# Patient Record
Sex: MALE | Race: WHITE | URBAN - METROPOLITAN AREA
[De-identification: names, ages, dates, MRNs, and addresses within clinical notes are randomized per-mention and may not be internally consistent; named-entity substitution may affect disease eponyms.]

---

## 2017-10-12 ENCOUNTER — HOSPITAL ENCOUNTER (OUTPATIENT)
Facility: CLINIC | Age: 77
Discharge: HOME | End: 2017-10-12
Attending: OPHTHALMOLOGY | Admitting: OPHTHALMOLOGY
Payer: MEDICARE

## 2017-10-12 ENCOUNTER — SURGERY (OUTPATIENT)
Age: 77
End: 2017-10-12

## 2017-10-12 ENCOUNTER — ANESTHESIA EVENT (OUTPATIENT)
Dept: SURGERY | Facility: CLINIC | Age: 77
End: 2017-10-12
Payer: MEDICARE

## 2017-10-12 ENCOUNTER — ANESTHESIA (OUTPATIENT)
Dept: SURGERY | Facility: CLINIC | Age: 77
End: 2017-10-12
Payer: MEDICARE

## 2017-10-12 VITALS
RESPIRATION RATE: 16 BRPM | DIASTOLIC BLOOD PRESSURE: 92 MMHG | SYSTOLIC BLOOD PRESSURE: 120 MMHG | OXYGEN SATURATION: 95 % | TEMPERATURE: 97.3 F

## 2017-10-12 PROCEDURE — 40000170 ZZH STATISTIC PRE-PROCEDURE ASSESSMENT II: Performed by: OPHTHALMOLOGY

## 2017-10-12 PROCEDURE — 71000028 ZZH EYE RECOVERY PHASE 2 EACH 15 MINS: Performed by: OPHTHALMOLOGY

## 2017-10-12 PROCEDURE — 37000008 ZZH ANESTHESIA TECHNICAL FEE, 1ST 30 MIN: Performed by: OPHTHALMOLOGY

## 2017-10-12 PROCEDURE — 25000128 H RX IP 250 OP 636: Performed by: OPHTHALMOLOGY

## 2017-10-12 PROCEDURE — 36000104 ZZH EYE SURGERY LEVEL 4 1ST 30 MIN: Performed by: OPHTHALMOLOGY

## 2017-10-12 PROCEDURE — 27210794 ZZH OR GENERAL SUPPLY STERILE: Performed by: OPHTHALMOLOGY

## 2017-10-12 PROCEDURE — 25000128 H RX IP 250 OP 636: Performed by: ANESTHESIOLOGY

## 2017-10-12 PROCEDURE — 27210995 ZZH RX 272: Performed by: OPHTHALMOLOGY

## 2017-10-12 PROCEDURE — V2632 POST CHMBR INTRAOCULAR LENS: HCPCS | Performed by: OPHTHALMOLOGY

## 2017-10-12 PROCEDURE — 25000125 ZZHC RX 250: Performed by: ANESTHESIOLOGY

## 2017-10-12 PROCEDURE — V2787 ASTIGMATISM-CORRECT FUNCTION: HCPCS | Performed by: OPHTHALMOLOGY

## 2017-10-12 PROCEDURE — 25000128 H RX IP 250 OP 636: Performed by: NURSE ANESTHETIST, CERTIFIED REGISTERED

## 2017-10-12 PROCEDURE — A9270 NON-COVERED ITEM OR SERVICE: HCPCS | Performed by: OPHTHALMOLOGY

## 2017-10-12 PROCEDURE — 25000125 ZZHC RX 250: Performed by: OPHTHALMOLOGY

## 2017-10-12 PROCEDURE — 25000125 ZZHC RX 250: Performed by: NURSE ANESTHETIST, CERTIFIED REGISTERED

## 2017-10-12 PROCEDURE — S0020 INJECTION, BUPIVICAINE HYDRO: HCPCS | Performed by: OPHTHALMOLOGY

## 2017-10-12 PROCEDURE — 37000009 ZZH ANESTHESIA TECHNICAL FEE, EACH ADDTL 15 MIN: Performed by: OPHTHALMOLOGY

## 2017-10-12 PROCEDURE — 36000105 ZZH EYE SURGERY LEVEL 4 EA 15 ADDTL MIN: Performed by: OPHTHALMOLOGY

## 2017-10-12 DEVICE — IMPLANTABLE DEVICE: Type: IMPLANTABLE DEVICE | Site: EYE | Status: FUNCTIONAL

## 2017-10-12 RX ORDER — BALANCED SALT SOLUTION 6.4; .75; .48; .3; 3.9; 1.7 MG/ML; MG/ML; MG/ML; MG/ML; MG/ML; MG/ML
SOLUTION OPHTHALMIC PRN
Status: DISCONTINUED | OUTPATIENT
Start: 2017-10-12 | End: 2017-10-12 | Stop reason: HOSPADM

## 2017-10-12 RX ORDER — ONDANSETRON 2 MG/ML
INJECTION INTRAMUSCULAR; INTRAVENOUS PRN
Status: DISCONTINUED | OUTPATIENT
Start: 2017-10-12 | End: 2017-10-12

## 2017-10-12 RX ORDER — PROPOFOL 10 MG/ML
INJECTION, EMULSION INTRAVENOUS PRN
Status: DISCONTINUED | OUTPATIENT
Start: 2017-10-12 | End: 2017-10-12

## 2017-10-12 RX ORDER — PROPARACAINE HYDROCHLORIDE 5 MG/ML
1 SOLUTION/ DROPS OPHTHALMIC ONCE
Status: DISCONTINUED | OUTPATIENT
Start: 2017-10-12 | End: 2017-10-12 | Stop reason: HOSPADM

## 2017-10-12 RX ORDER — SODIUM CHLORIDE, SODIUM LACTATE, POTASSIUM CHLORIDE, CALCIUM CHLORIDE 600; 310; 30; 20 MG/100ML; MG/100ML; MG/100ML; MG/100ML
INJECTION, SOLUTION INTRAVENOUS CONTINUOUS
Status: DISCONTINUED | OUTPATIENT
Start: 2017-10-12 | End: 2017-10-12 | Stop reason: HOSPADM

## 2017-10-12 RX ORDER — PHENYLEPHRINE HYDROCHLORIDE 25 MG/ML
1 SOLUTION/ DROPS OPHTHALMIC
Status: COMPLETED | OUTPATIENT
Start: 2017-10-12 | End: 2017-10-12

## 2017-10-12 RX ORDER — PROPARACAINE HYDROCHLORIDE 5 MG/ML
1 SOLUTION/ DROPS OPHTHALMIC ONCE
Status: COMPLETED | OUTPATIENT
Start: 2017-10-12 | End: 2017-10-12

## 2017-10-12 RX ORDER — FENTANYL CITRATE 50 UG/ML
INJECTION, SOLUTION INTRAMUSCULAR; INTRAVENOUS PRN
Status: DISCONTINUED | OUTPATIENT
Start: 2017-10-12 | End: 2017-10-12

## 2017-10-12 RX ORDER — DICLOFENAC SODIUM 1 MG/ML
1 SOLUTION/ DROPS OPHTHALMIC
Status: COMPLETED | OUTPATIENT
Start: 2017-10-12 | End: 2017-10-12

## 2017-10-12 RX ORDER — CYCLOPENTOLATE HYDROCHLORIDE 10 MG/ML
1 SOLUTION/ DROPS OPHTHALMIC
Status: COMPLETED | OUTPATIENT
Start: 2017-10-12 | End: 2017-10-12

## 2017-10-12 RX ORDER — MOXIFLOXACIN 5 MG/ML
1 SOLUTION/ DROPS OPHTHALMIC
Status: COMPLETED | OUTPATIENT
Start: 2017-10-12 | End: 2017-10-12

## 2017-10-12 RX ORDER — PHENYLEPHRINE HYDROCHLORIDE 25 MG/ML
1 SOLUTION/ DROPS OPHTHALMIC
Status: DISCONTINUED | OUTPATIENT
Start: 2017-10-12 | End: 2017-10-12 | Stop reason: HOSPADM

## 2017-10-12 RX ORDER — DEXAMETHASONE SODIUM PHOSPHATE 10 MG/ML
INJECTION, SOLUTION INTRAMUSCULAR; INTRAVENOUS PRN
Status: DISCONTINUED | OUTPATIENT
Start: 2017-10-12 | End: 2017-10-12 | Stop reason: HOSPADM

## 2017-10-12 RX ORDER — TROPICAMIDE 10 MG/ML
1 SOLUTION/ DROPS OPHTHALMIC
Status: COMPLETED | OUTPATIENT
Start: 2017-10-12 | End: 2017-10-12

## 2017-10-12 RX ADMIN — PHENYLEPHRINE HYDROCHLORIDE 1 DROP: 2.5 SOLUTION/ DROPS OPHTHALMIC at 06:41

## 2017-10-12 RX ADMIN — DICLOFENAC SODIUM 1 DROP: 1 SOLUTION OPHTHALMIC at 06:31

## 2017-10-12 RX ADMIN — PROPOFOL 30 MG: 10 INJECTION, EMULSION INTRAVENOUS at 07:17

## 2017-10-12 RX ADMIN — EPINEPHRINE 500 ML: 1 INJECTION, SOLUTION, CONCENTRATE INTRAVENOUS at 08:12

## 2017-10-12 RX ADMIN — SODIUM CHLORIDE, POTASSIUM CHLORIDE, SODIUM LACTATE AND CALCIUM CHLORIDE: 600; 310; 30; 20 INJECTION, SOLUTION INTRAVENOUS at 07:11

## 2017-10-12 RX ADMIN — WATER 0.5 ML: 1 INJECTION INTRAMUSCULAR; INTRAVENOUS; SUBCUTANEOUS at 08:10

## 2017-10-12 RX ADMIN — FENTANYL CITRATE 50 MCG: 50 INJECTION, SOLUTION INTRAMUSCULAR; INTRAVENOUS at 07:17

## 2017-10-12 RX ADMIN — SODIUM CHLORIDE, POTASSIUM CHLORIDE, SODIUM LACTATE AND CALCIUM CHLORIDE: 600; 310; 30; 20 INJECTION, SOLUTION INTRAVENOUS at 06:46

## 2017-10-12 RX ADMIN — CYCLOPENTOLATE HYDROCHLORIDE 1 DROP: 10 SOLUTION/ DROPS OPHTHALMIC at 06:36

## 2017-10-12 RX ADMIN — LIDOCAINE HYDROCHLORIDE 1 ML: 10 INJECTION, SOLUTION EPIDURAL; INFILTRATION; INTRACAUDAL; PERINEURAL at 06:46

## 2017-10-12 RX ADMIN — ONDANSETRON 4 MG: 2 INJECTION INTRAMUSCULAR; INTRAVENOUS at 08:09

## 2017-10-12 RX ADMIN — MOXIFLOXACIN 1 DROP: 5 SOLUTION/ DROPS OPHTHALMIC at 06:37

## 2017-10-12 RX ADMIN — CYCLOPENTOLATE HYDROCHLORIDE 1 DROP: 10 SOLUTION/ DROPS OPHTHALMIC at 06:31

## 2017-10-12 RX ADMIN — LIDOCAINE HYDROCHLORIDE 6.5 ML: 20 INJECTION, SOLUTION EPIDURAL; INFILTRATION; INTRACAUDAL; PERINEURAL at 07:20

## 2017-10-12 RX ADMIN — HYPROMELLOSE 2910 (4000 MPA.S) 4 DROP: 25 SOLUTION/ DROPS OPHTHALMIC at 08:10

## 2017-10-12 RX ADMIN — TROPICAMIDE 1 DROP: 10 SOLUTION/ DROPS OPHTHALMIC at 06:31

## 2017-10-12 RX ADMIN — MOXIFLOXACIN 1 DROP: 5 SOLUTION/ DROPS OPHTHALMIC at 06:41

## 2017-10-12 RX ADMIN — DICLOFENAC SODIUM 1 DROP: 1 SOLUTION OPHTHALMIC at 06:41

## 2017-10-12 RX ADMIN — MOXIFLOXACIN 1 DROP: 5 SOLUTION/ DROPS OPHTHALMIC at 06:31

## 2017-10-12 RX ADMIN — BALANCED SALT SOLUTION 15 ML: 6.4; .75; .48; .3; 3.9; 1.7 SOLUTION OPHTHALMIC at 08:09

## 2017-10-12 RX ADMIN — TROPICAMIDE 1 DROP: 10 SOLUTION/ DROPS OPHTHALMIC at 06:41

## 2017-10-12 RX ADMIN — PROPARACAINE HYDROCHLORIDE 1 DROP: 5 SOLUTION/ DROPS OPHTHALMIC at 06:30

## 2017-10-12 RX ADMIN — CYCLOPENTOLATE HYDROCHLORIDE 1 DROP: 10 SOLUTION/ DROPS OPHTHALMIC at 06:41

## 2017-10-12 RX ADMIN — MIDAZOLAM HYDROCHLORIDE 1 MG: 1 INJECTION, SOLUTION INTRAMUSCULAR; INTRAVENOUS at 07:17

## 2017-10-12 RX ADMIN — DEXAMETHASONE SODIUM PHOSPHATE 5 MG: 10 INJECTION, SOLUTION INTRAMUSCULAR; INTRAVENOUS at 08:10

## 2017-10-12 RX ADMIN — BALANCED SALT SOLUTION 0.5 ML: 6.4; .75; .48; .3; 3.9; 1.7 SOLUTION OPHTHALMIC at 08:12

## 2017-10-12 RX ADMIN — DICLOFENAC SODIUM 1 DROP: 1 SOLUTION OPHTHALMIC at 06:37

## 2017-10-12 RX ADMIN — DEXMEDETOMIDINE HYDROCHLORIDE 4 MCG: 100 INJECTION, SOLUTION INTRAVENOUS at 07:17

## 2017-10-12 RX ADMIN — TROPICAMIDE 1 DROP: 10 SOLUTION/ DROPS OPHTHALMIC at 06:36

## 2017-10-12 RX ADMIN — PHENYLEPHRINE HYDROCHLORIDE 1 DROP: 2.5 SOLUTION/ DROPS OPHTHALMIC at 06:37

## 2017-10-12 RX ADMIN — PHENYLEPHRINE HYDROCHLORIDE 1 DROP: 2.5 SOLUTION/ DROPS OPHTHALMIC at 06:31

## 2017-10-12 RX ADMIN — EPINEPHRINE 500 ML: 1 INJECTION, SOLUTION, CONCENTRATE INTRAVENOUS at 08:09

## 2017-10-12 RX ADMIN — NEOMYCIN SULFATE, POLYMYXIN B SULFATE, AND DEXAMETHASONE 1 G: 3.5; 10000; 1 OINTMENT OPHTHALMIC at 08:12

## 2017-10-12 RX ADMIN — Medication 1 APPLICATOR: at 08:11

## 2017-10-12 NOTE — ANESTHESIA CARE TRANSFER NOTE
Patient: Isaac Girard    Procedure(s):  LEFT EYE PHACOEMULSIFICATION CLEAR CORNEA WITH TORIC INTRAOCULAR LENS IMPLANT;  LEFT EYE VITRECTOMY PARSPLANA WITH 25 GAUGE SYSTEM, MEMBRANE PEEL, AIR FLUID EXCHANGE  - Wound Class: I-Clean   - Wound Class: I-Clean    Diagnosis: EPIRETINAL MEMBRANE ; CATARACT   Diagnosis Additional Information: No value filed.    Anesthesia Type:   MAC     Note:  Airway :Nasal Cannula  Patient transferred to:PACU  Comments: [facility] in recliner with armrests up, spontaneous respirations, O2 saturation maintained 97% on room air. All monitors and alarms on and functioning, clinically stable vital signs. Report given to recovery RN and questions answered. Patient alert and following verbal directions.Handoff Report: Identifed the Patient, Identified the Reponsible Provider, Reviewed the pertinent medical history, Discussed the surgical course, Reviewed Intra-OP anesthesia mangement and issues during anesthesia, Set expectations for post-procedure period and Allowed opportunity for questions and acknowledgement of understanding      Vitals: (Last set prior to Anesthesia Care Transfer)    CRNA VITALS  10/12/2017 0744 - 10/12/2017 0818      10/12/2017             Pulse: 51    Ht Rate: (!)  48    SpO2: 96 %    Resp Rate (set): 10                Electronically Signed By: PATTI Vázquez CRNA  October 12, 2017  8:18 AM

## 2017-10-12 NOTE — BRIEF OP NOTE
[facility] Brief Operative Note    Pre-operative diagnosis: EPIRETINAL MEMBRANE ; CATARACT , left eye   Post-operative diagnosis Epiretinal membrane and cataract, left eye   Procedure: Procedure(s):  LEFT EYE PHACOEMULSIFICATION CLEAR CORNEA WITH TORIC INTRAOCULAR LENS IMPLANT;  LEFT EYE VITRECTOMY PARSPLANA WITH 25 GAUGE SYSTEM, MEMBRANE PEEL, AIR FLUID EXCHANGE  - Wound Class: I-Clean   - Wound Class: I-Clean   Surgeon(s): Surgeon(s) and Role:  Panel 1:     * Ari Licea MD - Primary    Panel 2:     * Endy Xavier MD - Primary   Estimated blood loss: * No values recorded between 10/12/2017  7:29 AM and 10/12/2017  8:15 AM *    Specimens: * No specimens in log *   Findings: As above

## 2017-10-12 NOTE — IP AVS SNAPSHOT
MRN:[id number]                      After Visit Summary   10/12/2017    Isaac Girard    MRN: [id number]           Thank you!     Thank you for choosing [facility] for your care. Our goal is always to provide you with excellent care. Hearing back from our patients is one way we can continue to improve our services. Please take a few minutes to complete the written survey that you may receive in the mail after you visit with us. Thank you!        Patient Information     Date Of Birth          1940        About your hospital stay     You were admitted on:  October 12, 2017 You last received care in the:  [facility]    You were discharged on:  October 12, 2017       Who to Call     For medical emergencies, please call 911.  For non-urgent questions about your medical care, please call your primary care provider or clinic, None  For questions related to your surgery, please call your surgery clinic        Attending Provider     Provider Ari Ruiz MD Ophthalmology       Primary Care Provider Fax #    [facility] [phone]      Your next 10 appointments already scheduled     Nov 29, 2017   Procedure with Ari Licea MD   [facility] (--)    [address] [place] [address] [place], [address]  [facility] [address] [phone]              Further instructions from your care team        INSTRUCTIONS FOLLOWING SURGERY  DR. NDIAYE, DR. AGUILLON, DR. SANDOVAL, DR. HARTLEY, DR. FREDERICK    Will I have pain?  Some discomfort is normal and expected following surgery.  The first few days after surgery you may need to use prescription pain pills.  Taking Advil (Ibuprofen) regularly may help prevent pain.    Discomfort should gradually decrease and Tylenol or Advil should be sufficient to relieve pain. A foreign body sensation in the cornea of the eye is very common and caused by sutures placed at surgery. These sutures will go away in one  to two weeks. If the pain worsens, you should call the doctor.     Do I need to wear an eye patch?  You do not need to wear an eye patch at home after the doctor has removed the patch on your first day after surgery.  However, you may be more comfortable wearing a patch outside in the sun, when sleeping or napping, or in a roman, windy environment.     How much drainage should I have?  You may expect a moderate amount of drainage for a week.  Gradually the drainage should decrease.  The lids can be cleaned with a clean washcloth and gentle soap or diluted baby shampoo.  Wipe the eyelids gently from the nose outward. Some blood in the tears is a normal finding.    Will there be swelling?  Some swelling is normal for about a week or two after which it will gradually decrease.  Applying a cool compress, using a clean washcloth, for 5 - 10 minutes several times a day may reduce the swelling and make you more comfortable.  People may have some swelling of both eyes, especially if face down positioning is required. The white part of the eye may appear very red or bloody for a week or two. This may get worse a few days after surgery. Though the bright red appearance can look frightening, it is a normal finding early after surgery and will resolve in a few weeks.    Will I need to use eye drops?  You will be using several different kinds of eye drops or ointment (salve) when you leave the hospital.  The directions will be on each bottle. The medication with the red top will keep your eye dilated and may make your eye more sensitive to light. Wearing sunglasses may help. The other medication is a combination antibiotics/steroid to prevent infection and promote healing.  Occasionally a third drop is used to control the pressure in your eye. A new bottle of artificial tears or lubricant ointment may be used along with your prescription eye drops after surgery. You will be using drops from four to eight weeks. Bring all eye  medications (drops, ointments, or pills) with you to each visit.    Always wash your hands before putting in the eye drops.  You may wish to have someone else help you.  Pull down on the lower lid and squeeze one drop from the bottle being careful not to touch the dropper to your eye or eyelid.  One drop is sufficient, but another may be used if the first did not go into the eye.  It is often easier to put in the drops if you are reclining or lying down.  Wait five (5) minutes after the first drop before using the second drop to allow the medications to absorb into the eye.        How long will it take for my vision to improve?  Your vision should gradually improve, but it may take up to six months to regain your best vision.  Frequently, air or gas bubbles are injected into the eye at the time of surgery.  This will blur your vision significantly at first.  As the bubble becomes smaller it will cause a black line in your vision that moves as you move your head.  As the bubble becomes smaller you may notice that it looks more like a bubble or that it will break up into several smaller bubbles. It will take from a few days to a few weeks for the bubble to dissolve and be replaced by clear fluid.     You may notice floaters or double vision after your surgery.  These symptoms usually will decrease with time.  If the double vision is bothersome patching the eye may help.    If you notice a sudden worsening in your vision call your doctor.    Are there any physical restrictions after surgery?  If an air or gas bubble was placed in the eye during surgery you will be asked to spend most of your time (both awake and during the night) with your head in a specific position, NOT ON BACK.  As the eye heals and the bubble dissolves there will be less of a need for you to stay in that specific position.  You should avoid sleeping on your back until the bubble has totally dissolved and you have been given permission from your  surgeon. You should not fly in an airplane or go in high altitudes in the mountains while there is a bubble in your eye. If you should require any other surgery, under general anesthesia, while you still have an air bubble, have your surgeon or anesthetist contact us prior to your surgery. Some anesthetic agents can make the bubble expand and seriously damage your eye.  You will have a green medical alert band placed on your wrist for this reason; this should not be removed until the doctor gives you permission or removes it for you.    Heavy lifting (greater than 50 pounds), swimming and contact sports should be avoided for about 3 to 4 weeks after surgery.    You may resume your usual sexual activities about one week after surgery.    When may I return to work or my normal activities?  Depending on the type of work, you may return to work within a few days.  If your work involves physical activity or driving, you will need to restrict your activities and remain home longer.    You may watch TV, look at magazines, or work puzzles.  Reading may be uncomfortable for several days, but using the eyes will not cause any damage.    You may go outside as usual.  If conditions are windy or roman wear an eye pad to avoid getting dust or dirt in the eye.    Can I travel?  You cannot fly in an airplane or drive into the mountains as long as the air or gas bubble remains in your eye.    Are there any driving restrictions?  Someone will need to drive you home from the hospital.  Generally driving can be resumed in several days if you have good vision in your other eye.  If you do not feel comfortable driving, do not drive!  Your depth perception will be decreased so you will want to try driving during the day in light traffic until you feel comfortable driving.  You should restrict your driving while you are taking prescription pain pills as they also can affect your judgment.    When can I shower and wash my hair?  You may  shower or bathe when you get home, but avoid getting water in your eye.  You may want someone to help you shampoo your hair at first.      You may shave, brush your teeth, or comb your hair.  Do not use make-up, mascara, or creams/lotions around your eyes for several weeks     When will I see the doctor again?  Generally you will be seen the first day after surgery and again 1-2 weeks later.  If you have not received a return appointment before leaving the hospital, you should call our office during the business hours to arrange an appointment. If you will be seeing your local doctor instead of us, you will need to call that office to set up an appointment.    How do I reach a doctor if I have concerns?  One of our doctors is available by calling [phone] in the [facility], [phone] in [place], or [phone] from outside the area.  After normal office hours the answering service will put you in touch with the doctor on call. The doctors take call from home but are available for a retinal emergency. Please try to call for routine questions and prescription refills during business hours.    You should call your doctor if:      You notice a sudden decrease in your vision.      Have severe pain or pain increases rather than subsiding.      You notice a new black curtain over your eye that is not the gas bubble.    If you have any of these symptoms, you may need to be examined.    [facility]  Cataract Surgery Discharge Instructions  [facility] MD ARSENIO Negrete MD J. Hasan, MD C. Nichols, MD J. O'Neill, MD S. Schaefer, MD J. Stephens, MD        Start using drops tomorrow    Ofloxacin (Tan top) - one drop in surgical eye 3 times per day until gone.     Prednisolone acetate 1.0% (pink or white top) - one drop in surgical eye 3 times per day until gone.    Diclofenac (Grey top) - one drop in surgical eye 3  "times per day until gone.      Place shield over surgical eye at bedtime for 3 nights.      The eye will feel itchy, scratchy, and vision will be blurred, you may take Tylenol for the scratchy feeling if this is bothersome.      No eye rubbing or swimming for I week.      You may resume all prescription medications as directed by your primary doctor.      Call if increasing pain, progressively worsening vision or worsening redness of surgical eye.      On-call doctor can be reached at [phone].    [facility] Discharge  Instructions  Anesthesia ([facility])   Adult Discharge Instructions    For 24 hours after surgery    1. Get plenty of rest.  Make arrangements to have a responsible adult stay with you for at least 6 hours after you leave the hospital.  2. Do not drive or use heavy equipment for 24 hours.    3. Do not drink alcohol for 24 hours.  4. Do not sign legal documents or make important decisions for 24 hours.  5. Avoid strenuous or risky activities. You may feel lightheaded.  If so, sit for a few minutes before standing.  Have someone help you get up.   6. Conscious sedation patients may resume a regular diet..  7. Any questions of medical nature, call your physician.    Pending Results     No orders found from 10/10/2017 to 10/13/2017.            Admission Information     Date & Time Provider Department Dept. Phone    10/12/2017 Ari Licea MD [facility] [phone]      Your Vitals Were     Blood Pressure Temperature Respirations Pulse Oximetry          125/85 97.3  F (36.3  C) (Temporal) 16 95%        MyChart Information     [employer] lets you send messages to your doctor, view your test results, renew your prescriptions, schedule appointments and more. To sign up, go to [email or web address] . Click on \"Log in\" on the left side of the screen, which will take you to the Welcome page. Then click on \"Sign up Now\" on the right side " of the page.     You will be asked to enter the access code listed below, as well as some personal information. Please follow the directions to create your username and password.     Your access code is: [email or web address]  Expires: 1/10/2018  8:25 AM     Your access code will  in 90 days. If you need help or a new code, please call your [facility] clinic or [phone].        Care EveryWhere ID     This is your Care EveryWhere ID. This could be used by other organizations to access your [facility] medical records  [id number]        Equal Access to Services     [facility]: Hadii zaid negron hadyulissao Sogm, waaxda luqadaha, qaybta kaalmageoffrey vang, richard garza . So [facility] [phone].    ATENCIÓN: Si habla español, tiene a haywood disposición servicios gratuitos de asistencia lingüística. Llame al [phone].    We comply with applicable federal civil rights laws and [place] laws. We do not discriminate on the basis of race, color, national origin, age, disability, sex, sexual orientation, or gender identity.               Review of your medicines      CONTINUE these medicines which have NOT CHANGED        Dose / Directions    alendronate 70 MG tablet   Commonly known as:  FOSAMAX        Dose:  70 mg   Take 1 tablet by mouth every 7 days.   Quantity:  12 tablet   Refills:  3       calcium-vitamin D 250-125 MG-UNIT per tablet   Commonly known as:  OSCAL        Dose:  2 tablet   Take 2 tablets by mouth 3 times daily.   Quantity:  1 tablet   Refills:  0       chlorthalidone 25 MG tablet   Commonly known as:  HYGROTON        Dose:  25 mg   Take 1 tablet by mouth daily.   Quantity:  90 tablet   Refills:  1       fish oil-omega-3 fatty acids 1000 MG capsule        Dose:  2 g   Take 2 capsules by mouth daily.   Quantity:  180 capsule   Refills:  12       flax seed oil 1000 MG capsule        Dose:  1 capsule   Take 1 capsule by mouth daily.   Quantity:  100 capsule   Refills:  3       multivitamin  per tablet        Dose:  1 tablet   Take 1 tablet by mouth daily.   Quantity:  100 tablet   Refills:  12       simvastatin 20 MG tablet   Commonly known as:  ZOCOR        Dose:  20 mg   Take 1 tablet by mouth At Bedtime. Take 1/2 tablet po at bedtime   Quantity:  90 tablet   Refills:  3                Protect others around you: Learn how to safely use, store and throw away your medicines at www.disposemymeds.org.             Medication List: This is a list of all your medications and when to take them. Check marks below indicate your daily home schedule. Keep this list as a reference.      Medications           Morning Afternoon Evening Bedtime As Needed    alendronate 70 MG tablet   Commonly known as:  FOSAMAX   Take 1 tablet by mouth every 7 days.                                calcium-vitamin D 250-125 MG-UNIT per tablet   Commonly known as:  OSCAL   Take 2 tablets by mouth 3 times daily.                                chlorthalidone 25 MG tablet   Commonly known as:  HYGROTON   Take 1 tablet by mouth daily.                                fish oil-omega-3 fatty acids 1000 MG capsule   Take 2 capsules by mouth daily.                                flax seed oil 1000 MG capsule   Take 1 capsule by mouth daily.                                multivitamin per tablet   Take 1 tablet by mouth daily.                                simvastatin 20 MG tablet   Commonly known as:  ZOCOR   Take 1 tablet by mouth At Bedtime. Take 1/2 tablet po at bedtime

## 2017-10-12 NOTE — ANESTHESIA PREPROCEDURE EVALUATION
Anesthesia Evaluation     . Pt has had prior anesthetic.     No history of anesthetic complications          ROS/MED HX    ENT/Pulmonary:      (-) sleep apnea   Neurologic:       Cardiovascular:     (+) Dyslipidemia, hypertension----. : . . . :. .       METS/Exercise Tolerance:     Hematologic:         Musculoskeletal:   (+) arthritis, , , -       GI/Hepatic:        (-) GERD and liver disease   Renal/Genitourinary:      (-) renal disease   Endo:      (-) Type II DM and thyroid disease   Psychiatric:         Infectious Disease:         Malignancy:         Other:                     Physical Exam  Normal systems: cardiovascular, pulmonary and dental    Airway   Mallampati: II  TM distance: >3 FB  Neck ROM: full    Dental     Cardiovascular       Pulmonary                     Anesthesia Plan      History & Physical Review  History and physical reviewed and following examination; no interval change.    ASA Status:  2 .    NPO Status:  > 8 hours    Plan for MAC Reason for MAC:  Procedure to face, neck, head or breast  PONV prophylaxis:  Ondansetron (or other 5HT-3)       Postoperative Care      Consents  Anesthetic plan, risks, benefits and alternatives discussed with:  Patient..          Procedure: Procedure(s):  PHACOEMULSIFICATION CLEAR CORNEA WITH TORIC INTRAOCULAR LENS IMPLANT  VITRECTOMY PARSPLANA WITH 25 GAUGE SYSTEM  Preop diagnosis: EPIRETINAL MEMBRANE ; CATARACT     No Known Allergies  Past Medical History:   Diagnosis Date     Hyperlipidemia      Hypertension      Osteoporosis      Past Surgical History:   Procedure Laterality Date     ORTHOPEDIC SURGERY  2005    left ankle      ORTHOPEDIC SURGERY  1993    right hip      ORTHOPEDIC SURGERY  2011    right ankle     Prior to Admission medications    Medication Sig Start Date End Date Taking? Authorizing Provider   simvastatin (ZOCOR) 20 MG tablet Take 1 tablet by mouth At Bedtime. Take 1/2 tablet po at bedtime 5/13/11  Yes Jeff Dewitt MD    chlorthalidone (HYGROTEN) 25 MG tablet Take 1 tablet by mouth daily. 5/13/11  Yes Jeff Dewitt MD   fish oil-omega-3 fatty acids 1000 MG capsule Take 2 capsules by mouth daily. 5/13/11  Yes Jeff Dewitt MD   Flaxseed, Linseed, (FLAX SEED OIL) 1000 MG capsule Take 1 capsule by mouth daily. 5/13/11  Yes Jeff Dewitt MD   calcium-vitamin D (OSCAL) 250-125 MG-UNIT per tablet Take 2 tablets by mouth 3 times daily. 5/13/11  Yes Jeff Dewitt MD   ALENdronate (FOSAMAX) 70 MG tablet Take 1 tablet by mouth every 7 days. 5/13/11   Jeff Dewitt MD   Multiple Vitamin (MULTIVITAMIN) per tablet Take 1 tablet by mouth daily. 5/13/11   Jeff Dewitt MD     Current Facility-Administered Medications Ordered in Epic   Medication Dose Route Frequency Last Rate Last Dose     lactated ringers infusion   Intravenous Continuous         phenylephrine (MYDFRIN /SAJAN-SYNEPHRINE) 2.5 % ophthalmic solution 1 drop  1 drop Ophthalmic q5 Min Prior to Surgery         bupivacaine 0.75% (pf) 4.5mL + lidocaine 2% (pf) 4.5mL + hyaluronidase (HYLENEX) 150 units   Retrobulbar Once         povidone-iodine 5 % ophthalmic solution 1 drop  1 drop Ophthalmic Once         proparacaine (ALCAINE) 0.5 % ophthalmic solution 1 drop  1 drop Ophthalmic Once         lidocaine (AKTEN) ophthalmic gel 0.5 mL  0.5 mL Ophthalmic Once         povidone-iodine 5 % ophthalmic solution 1 drop  1 drop Ophthalmic Once         lidocaine 1 % 1 mL  1 mL Other Q1H PRN   1 mL at 10/12/17 0646     lactated ringers infusion   Intravenous Continuous 25 mL/hr at 10/12/17 0646       No current [facility]-ordered outpatient prescriptions on file.     Wt Readings from Last 1 Encounters:   05/13/11 98.9 kg (218 lb)     Temp Readings from Last 1 Encounters:   10/12/17 36.3  C (97.3  F) (Temporal)     BP Readings from Last 6 Encounters:   10/12/17 123/81   05/13/11 146/80     Pulse Readings from Last 4 Encounters:   05/13/11 75     Resp Readings from Last 1  Encounters:   10/12/17 16     SpO2 Readings from Last 1 Encounters:   10/12/17 94%     Recent Labs   Lab Test  05/16/11   1635  05/13/11   1021   NA   --   140   POTASSIUM   --   3.8   CHLORIDE   --   98   CO2   --   28   ANIONGAP   --   14   GLC   --   91   BUN   --   19   CR  0.87  0.86   PAWEL   --   9.5     Recent Labs   Lab Test  05/13/11   1021   HGB  16.0     No results for input(s): INR in the last 77709 hours.    Invalid input(s): APTT   RECENT LABS:   ECG:   ECHO:   CXR:                  .

## 2017-10-12 NOTE — ANESTHESIA POSTPROCEDURE EVALUATION
Patient: Isaac Girard    Procedure(s):  LEFT EYE PHACOEMULSIFICATION CLEAR CORNEA WITH TORIC INTRAOCULAR LENS IMPLANT;  LEFT EYE VITRECTOMY PARSPLANA WITH 25 GAUGE SYSTEM, MEMBRANE PEEL, AIR FLUID EXCHANGE  - Wound Class: I-Clean   - Wound Class: I-Clean    Diagnosis:EPIRETINAL MEMBRANE ; CATARACT   Diagnosis Additional Information: No value filed.    Anesthesia Type:  MAC    Note:  Anesthesia Post Evaluation    Patient location during evaluation: PACU  Patient participation: Able to fully participate in evaluation  Level of consciousness: awake and alert  Pain management: satisfactory to patient  Airway patency: patent  Cardiovascular status: hemodynamically stable  Respiratory status: acceptable and unassisted  Hydration status: balanced  PONV: none     Anesthetic complications: None          Last vitals:  Vitals:    10/12/17 0636 10/12/17 0814 10/12/17 0830   BP: 123/81 125/85 (!) 120/92   Resp: 16 16 16   Temp: 36.3  C (97.3  F)     SpO2: 94% 95% 95%         Electronically Signed By: Casimiro Bolivar MD  October 12, 2017  12:04 PM

## 2017-10-12 NOTE — OP NOTE
PREOPERATIVE DIAGNOSIS: Cataract, Left  Left eye.   POSTOPERATIVE DIAGNOSIS: Cataract, Left  Left eye.   OPERATION: Phacoemulsification with implantation of posterior chamber intraocular lens, Left  Left eye.   ANESTHESIA: Monitored anesthesia care.   INDICATIONS FOR SURGERY: Isaac Girard has noted a progressive decline in the vision of his Left  Left eye secondary to a cataract. This has affected his ability to perform routine functions including reading. The patient has progressive cataract changes consistent with his vision and symptoms.     PROCEDURE: Informed consent was obtained from the patient preoperatively with the risks and alternatives reviewed, including the possibility of loss of vision.  The patient was seated upright and the left eye was marked for placement of a toric IOL.  In the preoperative area, the patient was administered a retrobulbar block by Dr. Xavier. The patient was taken to the operating room. The face was prepped and draped in the usual sterile fashion. Attention was directed to the Left  Left eye. A stab incision was made at the limbus with a 15-degree blade. Viscoat was used to replace aqueous. A keratome was used to make a limbal self-sealing incision 2.5 mm in diameter. A curvilinear capsulorrhexis was performed with the Utrata forceps. Hydrodissection was carried out. The nucleus was removed with the phacoemulsification handpiece in a four-quadrant cracking technique. The cortex was removed with the irrigation and aspiration handpiece. The posterior capsule remained intact. Provisc was used to inflate the capsular bag. A posterior chamber intraocular lens was taken from its case and inspected. It was free of defects and it was folded into the shooter. The lens was then injected into the eye by directing the leading haptic into the capsular bag. The trailing haptic was then placed in the eye with a haptic . The toric markings were aligned. The lens centered well. The  Provisc was removed from the eye with the I&A handpiece. The eye was inflated with balanced salt solution. The wound was inspected and found to be watertight. A single 10.0 nylon was placed.  Dr. Xavier the commenced with his portion of the procedure.    Implant Name Type Inv. Item Serial No.  Lot No. LRB No. Used   EYE IMP IOL MIA 1-PIECE TECNIS TORIC +18.0 [id number] Lens/Eye Implant EYE IMP IOL MIA 1-PIECE TECNIS TORIC +18.0 [id number] [employer]   Left 1       Ari Licea M.D.

## 2017-10-12 NOTE — DISCHARGE INSTRUCTIONS
INSTRUCTIONS FOLLOWING SURGERY  DR. NDIAYE, DR. AGUILLON, DR. SANDOVAL, DR. HARTLEY, DR. FREDERICK    Will I have pain?  Some discomfort is normal and expected following surgery.  The first few days after surgery you may need to use prescription pain pills.  Taking Advil (Ibuprofen) regularly may help prevent pain.    Discomfort should gradually decrease and Tylenol or Advil should be sufficient to relieve pain. A foreign body sensation in the cornea of the eye is very common and caused by sutures placed at surgery. These sutures will go away in one to two weeks. If the pain worsens, you should call the doctor.     Do I need to wear an eye patch?  You do not need to wear an eye patch at home after the doctor has removed the patch on your first day after surgery.  However, you may be more comfortable wearing a patch outside in the sun, when sleeping or napping, or in a roman, windy environment.     How much drainage should I have?  You may expect a moderate amount of drainage for a week.  Gradually the drainage should decrease.  The lids can be cleaned with a clean washcloth and gentle soap or diluted baby shampoo.  Wipe the eyelids gently from the nose outward. Some blood in the tears is a normal finding.    Will there be swelling?  Some swelling is normal for about a week or two after which it will gradually decrease.  Applying a cool compress, using a clean washcloth, for 5 - 10 minutes several times a day may reduce the swelling and make you more comfortable.  People may have some swelling of both eyes, especially if face down positioning is required. The white part of the eye may appear very red or bloody for a week or two. This may get worse a few days after surgery. Though the bright red appearance can look frightening, it is a normal finding early after surgery and will resolve in a few weeks.    Will I need to use eye drops?  You will be using several different kinds of eye drops or ointment (salve) when you  leave the hospital.  The directions will be on each bottle. The medication with the red top will keep your eye dilated and may make your eye more sensitive to light. Wearing sunglasses may help. The other medication is a combination antibiotics/steroid to prevent infection and promote healing.  Occasionally a third drop is used to control the pressure in your eye. A new bottle of artificial tears or lubricant ointment may be used along with your prescription eye drops after surgery. You will be using drops from four to eight weeks. Bring all eye medications (drops, ointments, or pills) with you to each visit.    Always wash your hands before putting in the eye drops.  You may wish to have someone else help you.  Pull down on the lower lid and squeeze one drop from the bottle being careful not to touch the dropper to your eye or eyelid.  One drop is sufficient, but another may be used if the first did not go into the eye.  It is often easier to put in the drops if you are reclining or lying down.  Wait five (5) minutes after the first drop before using the second drop to allow the medications to absorb into the eye.        How long will it take for my vision to improve?  Your vision should gradually improve, but it may take up to six months to regain your best vision.  Frequently, air or gas bubbles are injected into the eye at the time of surgery.  This will blur your vision significantly at first.  As the bubble becomes smaller it will cause a black line in your vision that moves as you move your head.  As the bubble becomes smaller you may notice that it looks more like a bubble or that it will break up into several smaller bubbles. It will take from a few days to a few weeks for the bubble to dissolve and be replaced by clear fluid.     You may notice floaters or double vision after your surgery.  These symptoms usually will decrease with time.  If the double vision is bothersome patching the eye may help.    If  you notice a sudden worsening in your vision call your doctor.    Are there any physical restrictions after surgery?  If an air or gas bubble was placed in the eye during surgery you will be asked to spend most of your time (both awake and during the night) with your head in a specific position, NOT ON BACK.  As the eye heals and the bubble dissolves there will be less of a need for you to stay in that specific position.  You should avoid sleeping on your back until the bubble has totally dissolved and you have been given permission from your surgeon. You should not fly in an airplane or go in high altitudes in the mountains while there is a bubble in your eye. If you should require any other surgery, under general anesthesia, while you still have an air bubble, have your surgeon or anesthetist contact us prior to your surgery. Some anesthetic agents can make the bubble expand and seriously damage your eye.  You will have a green medical alert band placed on your wrist for this reason; this should not be removed until the doctor gives you permission or removes it for you.    Heavy lifting (greater than 50 pounds), swimming and contact sports should be avoided for about 3 to 4 weeks after surgery.    You may resume your usual sexual activities about one week after surgery.    When may I return to work or my normal activities?  Depending on the type of work, you may return to work within a few days.  If your work involves physical activity or driving, you will need to restrict your activities and remain home longer.    You may watch TV, look at magazines, or work puzzles.  Reading may be uncomfortable for several days, but using the eyes will not cause any damage.    You may go outside as usual.  If conditions are windy or roman wear an eye pad to avoid getting dust or dirt in the eye.    Can I travel?  You cannot fly in an airplane or drive into the mountains as long as the air or gas bubble remains in your  eye.    Are there any driving restrictions?  Someone will need to drive you home from the hospital.  Generally driving can be resumed in several days if you have good vision in your other eye.  If you do not feel comfortable driving, do not drive!  Your depth perception will be decreased so you will want to try driving during the day in light traffic until you feel comfortable driving.  You should restrict your driving while you are taking prescription pain pills as they also can affect your judgment.    When can I shower and wash my hair?  You may shower or bathe when you get home, but avoid getting water in your eye.  You may want someone to help you shampoo your hair at first.      You may shave, brush your teeth, or comb your hair.  Do not use make-up, mascara, or creams/lotions around your eyes for several weeks     When will I see the doctor again?  Generally you will be seen the first day after surgery and again 1-2 weeks later.  If you have not received a return appointment before leaving the hospital, you should call our office during the business hours to arrange an appointment. If you will be seeing your local doctor instead of us, you will need to call that office to set up an appointment.    How do I reach a doctor if I have concerns?  One of our doctors is available by calling [phone] in the [place] area, [phone] in [place], or [phone] from outside the area.  After normal office hours the answering service will put you in touch with the doctor on call. The doctors take call from home but are available for a retinal emergency. Please try to call for routine questions and prescription refills during business hours.    You should call your doctor if:      You notice a sudden decrease in your vision.      Have severe pain or pain increases rather than subsiding.      You notice a new black curtain over your eye that is not the gas bubble.    If you have any of these symptoms, you  may need to be examined.    [facility]  Cataract Surgery Discharge Instructions  [facility] MD ARSENIO Negrete MD J. Hasan, MD C. Nichols, MD J. O'Neill, MD S. Schaefer, MD J. Stephens, MD        Start using drops tomorrow    Ofloxacin (Tan top) - one drop in surgical eye 3 times per day until gone.     Prednisolone acetate 1.0% (pink or white top) - one drop in surgical eye 3 times per day until gone.    Diclofenac (Grey top) - one drop in surgical eye 3 times per day until gone.      Place shield over surgical eye at bedtime for 3 nights.      The eye will feel itchy, scratchy, and vision will be blurred, you may take Tylenol for the scratchy feeling if this is bothersome.      No eye rubbing or swimming for I week.      You may resume all prescription medications as directed by your primary doctor.      Call if increasing pain, progressively worsening vision or worsening redness of surgical eye.      On-call doctor can be reached at [phone].    [facility] Discharge  Instructions  Anesthesia ([facility])   Adult Discharge Instructions    For 24 hours after surgery    1. Get plenty of rest.  Make arrangements to have a responsible adult stay with you for at least 6 hours after you leave the hospital.  2. Do not drive or use heavy equipment for 24 hours.    3. Do not drink alcohol for 24 hours.  4. Do not sign legal documents or make important decisions for 24 hours.  5. Avoid strenuous or risky activities. You may feel lightheaded.  If so, sit for a few minutes before standing.  Have someone help you get up.   6. Conscious sedation patients may resume a regular diet..  7. Any questions of medical nature, call your physician.

## 2017-10-12 NOTE — IP AVS SNAPSHOT
[facility]    [address]    [place] [address]    Phone:  [phone]    Fax:  [phone]                                       After Visit Summary   10/12/2017    Isaac Girard    MRN: [id number]           After Visit Summary Signature Page     I have received my discharge instructions, and my questions have been answered. I have discussed any challenges I see with this plan with the nurse or doctor.    ..........................................................................................................................................  Patient/Patient Representative Signature      ..........................................................................................................................................  Patient Representative Print Name and Relationship to Patient    ..................................................               ................................................  Date                                            Time    ..........................................................................................................................................  Reviewed by Signature/Title    ...................................................              ..............................................  Date                                                            Time

## 2017-10-13 NOTE — OP NOTE
DATE OF SURGERY:  10/12/2017      PREOPERATIVE DIAGNOSES:    1.  Epiretinal membrane, left eye.   2.  Cataract, left eye.      POSTOPERATIVE  DIAGNOSES:     1.  Epiretinal membrane, left eye.   2.  Cataract, left eye.      PROCEDURES:   1.  Cataract extraction with intraocular lens implantation, left eye.   2.  25-gauge pars plana vitrectomy, membrane peel, air-fluid exchange, left eye.      SURGEONS:   1.  Ari Licea Jr, MD   2.  Endy Xavier MD      ASSISTANT:  JOEL Bautista      ANESTHESIA:  Local with monitored anesthesia care.      ESTIMATED BLOOD LOSS:  Minimum.      SPECIMENS:  None.      COMPLICATIONS:  None.      INDICATIONS:  Isaac Girard presented with decreased vision in the left eye and was found to have a cataract and epiretinal membrane.  Surgical intervention to address these issues was offered, and the patient decided to proceed after the risks, benefits and alternatives were explained.  Signed and witnessed informed consent was obtained.      DESCRIPTION OF PROCEDURE:  The patient was given a retrobulbar block in the preoperative holding area.  The patient was taken to the operating room and placed in the supine position.  The left eye was prepped and draped in the usual sterile fashion for ophthalmic surgery, and a lid speculum was placed.  Cataract surgery was performed by Dr. Licea.  Please see his separate note for details.  The eye was then opened for a standard 25-gauge pars plana vitrectomy.  The eye was entered with a light pipe and vitrectomy probe, and the BIOM was positioned.  A thorough vitrectomy was performed.  A posterior vitreous separation was created at the optic disc, and the vitreous was trimmed into the periphery.  Attention was turned to the posterior pole.  Magnifying contact lens was placed on the cornea.  Dilute triamcinolone was injected over the macular surface.  The epiretinal membrane and underlying internal limiting membrane were peeled from arcade to arcade  with a 25-gauge forceps.  The BIOM was then repositioned and the retinal periphery was inspected with scleral depression.  No retinal tears were found.  A partial air-fluid exchange was performed.  The corneal suture that was spanning the cataract incision was removed.  All trocars were removed.  The eye was left at physiologic pressure.  The sclerotomies were airtight.  Subconjunctival injections of Ancef and dexamethasone were placed.  Maxitrol and atropine were applied.  A sterile eye pad and shield were taped into position.  The patient was taken to the recovery area in stable condition after tolerating surgery well.  He will follow up in 1 day.         YANIRA AGUILLON MD             D: 10/12/2017 08:15   T: 10/12/2017 22:25   MT:       Name:     FLAKITO CLAY   MRN:      [id number]        Account:        [id number]   :      1940           Procedure Date: 10/12/2017      Document: [id number]

## 2017-11-28 RX ORDER — NITROGLYCERIN 0.4 MG/1
0.4 TABLET SUBLINGUAL EVERY 5 MIN PRN
COMMUNITY

## 2017-11-29 ENCOUNTER — HOSPITAL ENCOUNTER (OUTPATIENT)
Facility: CLINIC | Age: 77
Discharge: HOME | End: 2017-11-29
Attending: OPHTHALMOLOGY | Admitting: OPHTHALMOLOGY
Payer: MEDICARE

## 2017-11-29 ENCOUNTER — SURGERY (OUTPATIENT)
Age: 77
End: 2017-11-29

## 2017-11-29 ENCOUNTER — ANESTHESIA EVENT (OUTPATIENT)
Dept: SURGERY | Facility: CLINIC | Age: 77
End: 2017-11-29
Payer: MEDICARE

## 2017-11-29 ENCOUNTER — ANESTHESIA (OUTPATIENT)
Dept: SURGERY | Facility: CLINIC | Age: 77
End: 2017-11-29
Payer: MEDICARE

## 2017-11-29 VITALS
RESPIRATION RATE: 16 BRPM | SYSTOLIC BLOOD PRESSURE: 119 MMHG | HEIGHT: 71 IN | TEMPERATURE: 95.9 F | WEIGHT: 214.6 LBS | OXYGEN SATURATION: 97 % | DIASTOLIC BLOOD PRESSURE: 76 MMHG | BODY MASS INDEX: 30.04 KG/M2

## 2017-11-29 PROCEDURE — 25000125 ZZHC RX 250: Performed by: ANESTHESIOLOGY

## 2017-11-29 PROCEDURE — 40000170 ZZH STATISTIC PRE-PROCEDURE ASSESSMENT II: Performed by: OPHTHALMOLOGY

## 2017-11-29 PROCEDURE — 27210794 ZZH OR GENERAL SUPPLY STERILE: Performed by: OPHTHALMOLOGY

## 2017-11-29 PROCEDURE — 25000125 ZZHC RX 250: Performed by: NURSE ANESTHETIST, CERTIFIED REGISTERED

## 2017-11-29 PROCEDURE — 37000008 ZZH ANESTHESIA TECHNICAL FEE, 1ST 30 MIN: Performed by: OPHTHALMOLOGY

## 2017-11-29 PROCEDURE — V2632 POST CHMBR INTRAOCULAR LENS: HCPCS | Performed by: OPHTHALMOLOGY

## 2017-11-29 PROCEDURE — 25000128 H RX IP 250 OP 636: Performed by: ANESTHESIOLOGY

## 2017-11-29 PROCEDURE — 25000125 ZZHC RX 250: Performed by: OPHTHALMOLOGY

## 2017-11-29 PROCEDURE — 25000128 H RX IP 250 OP 636: Performed by: NURSE ANESTHETIST, CERTIFIED REGISTERED

## 2017-11-29 PROCEDURE — V2787 ASTIGMATISM-CORRECT FUNCTION: HCPCS | Performed by: OPHTHALMOLOGY

## 2017-11-29 PROCEDURE — 25000128 H RX IP 250 OP 636: Performed by: OPHTHALMOLOGY

## 2017-11-29 PROCEDURE — 71000028 ZZH EYE RECOVERY PHASE 2 EACH 15 MINS: Performed by: OPHTHALMOLOGY

## 2017-11-29 PROCEDURE — 36000101 ZZH EYE SURGERY LEVEL 3 1ST 30 MIN: Performed by: OPHTHALMOLOGY

## 2017-11-29 DEVICE — IMPLANTABLE DEVICE: Type: IMPLANTABLE DEVICE | Site: EYE | Status: FUNCTIONAL

## 2017-11-29 RX ORDER — PHENYLEPHRINE HYDROCHLORIDE 25 MG/ML
1 SOLUTION/ DROPS OPHTHALMIC
Status: COMPLETED | OUTPATIENT
Start: 2017-11-29 | End: 2017-11-29

## 2017-11-29 RX ORDER — SODIUM CHLORIDE, SODIUM LACTATE, POTASSIUM CHLORIDE, CALCIUM CHLORIDE 600; 310; 30; 20 MG/100ML; MG/100ML; MG/100ML; MG/100ML
INJECTION, SOLUTION INTRAVENOUS CONTINUOUS
Status: DISCONTINUED | OUTPATIENT
Start: 2017-11-29 | End: 2017-11-29 | Stop reason: HOSPADM

## 2017-11-29 RX ORDER — LIDOCAINE HYDROCHLORIDE 10 MG/ML
INJECTION, SOLUTION EPIDURAL; INFILTRATION; INTRACAUDAL; PERINEURAL PRN
Status: DISCONTINUED | OUTPATIENT
Start: 2017-11-29 | End: 2017-11-29 | Stop reason: HOSPADM

## 2017-11-29 RX ORDER — TROPICAMIDE 10 MG/ML
1 SOLUTION/ DROPS OPHTHALMIC
Status: COMPLETED | OUTPATIENT
Start: 2017-11-29 | End: 2017-11-29

## 2017-11-29 RX ORDER — PROPARACAINE HYDROCHLORIDE 5 MG/ML
1 SOLUTION/ DROPS OPHTHALMIC ONCE
Status: DISCONTINUED | OUTPATIENT
Start: 2017-11-29 | End: 2017-11-29 | Stop reason: HOSPADM

## 2017-11-29 RX ORDER — ONDANSETRON 2 MG/ML
INJECTION INTRAMUSCULAR; INTRAVENOUS PRN
Status: DISCONTINUED | OUTPATIENT
Start: 2017-11-29 | End: 2017-11-29

## 2017-11-29 RX ORDER — BALANCED SALT SOLUTION 6.4; .75; .48; .3; 3.9; 1.7 MG/ML; MG/ML; MG/ML; MG/ML; MG/ML; MG/ML
SOLUTION OPHTHALMIC PRN
Status: DISCONTINUED | OUTPATIENT
Start: 2017-11-29 | End: 2017-11-29 | Stop reason: HOSPADM

## 2017-11-29 RX ORDER — DICLOFENAC SODIUM 1 MG/ML
1 SOLUTION/ DROPS OPHTHALMIC
Status: COMPLETED | OUTPATIENT
Start: 2017-11-29 | End: 2017-11-29

## 2017-11-29 RX ORDER — PROPARACAINE HYDROCHLORIDE 5 MG/ML
1 SOLUTION/ DROPS OPHTHALMIC ONCE
Status: COMPLETED | OUTPATIENT
Start: 2017-11-29 | End: 2017-11-29

## 2017-11-29 RX ORDER — MOXIFLOXACIN 5 MG/ML
1 SOLUTION/ DROPS OPHTHALMIC
Status: COMPLETED | OUTPATIENT
Start: 2017-11-29 | End: 2017-11-29

## 2017-11-29 RX ADMIN — PHENYLEPHRINE HYDROCHLORIDE 1 DROP: 2.5 SOLUTION/ DROPS OPHTHALMIC at 10:47

## 2017-11-29 RX ADMIN — EPINEPHRINE 500 ML: 1 INJECTION, SOLUTION, CONCENTRATE INTRAVENOUS at 11:59

## 2017-11-29 RX ADMIN — DICLOFENAC SODIUM 1 DROP: 1 SOLUTION OPHTHALMIC at 10:56

## 2017-11-29 RX ADMIN — DICLOFENAC SODIUM 1 DROP: 1 SOLUTION OPHTHALMIC at 10:51

## 2017-11-29 RX ADMIN — PHENYLEPHRINE HYDROCHLORIDE 1 DROP: 2.5 SOLUTION/ DROPS OPHTHALMIC at 10:51

## 2017-11-29 RX ADMIN — TROPICAMIDE 1 DROP: 10 SOLUTION/ DROPS OPHTHALMIC at 10:51

## 2017-11-29 RX ADMIN — SODIUM CHLORIDE, POTASSIUM CHLORIDE, SODIUM LACTATE AND CALCIUM CHLORIDE: 600; 310; 30; 20 INJECTION, SOLUTION INTRAVENOUS at 10:58

## 2017-11-29 RX ADMIN — MOXIFLOXACIN 1 DROP: 5 SOLUTION/ DROPS OPHTHALMIC at 10:44

## 2017-11-29 RX ADMIN — ONDANSETRON 4 MG: 2 INJECTION INTRAMUSCULAR; INTRAVENOUS at 11:53

## 2017-11-29 RX ADMIN — TROPICAMIDE 1 DROP: 10 SOLUTION/ DROPS OPHTHALMIC at 10:56

## 2017-11-29 RX ADMIN — DEXMEDETOMIDINE HYDROCHLORIDE 8 MCG: 100 INJECTION, SOLUTION INTRAVENOUS at 11:52

## 2017-11-29 RX ADMIN — MOXIFLOXACIN 1 DROP: 5 SOLUTION/ DROPS OPHTHALMIC at 10:51

## 2017-11-29 RX ADMIN — TROPICAMIDE 1 DROP: 10 SOLUTION/ DROPS OPHTHALMIC at 10:43

## 2017-11-29 RX ADMIN — BALANCED SALT SOLUTION 15 ML: 6.4; .75; .48; .3; 3.9; 1.7 SOLUTION OPHTHALMIC at 11:59

## 2017-11-29 RX ADMIN — LIDOCAINE HYDROCHLORIDE 2 DROP: 35 GEL OPHTHALMIC at 11:59

## 2017-11-29 RX ADMIN — Medication 1 APPLICATOR: at 11:59

## 2017-11-29 RX ADMIN — PROPARACAINE HYDROCHLORIDE 1 DROP: 5 SOLUTION/ DROPS OPHTHALMIC at 10:44

## 2017-11-29 RX ADMIN — MOXIFLOXACIN 1 DROP: 5 SOLUTION/ DROPS OPHTHALMIC at 10:56

## 2017-11-29 RX ADMIN — LIDOCAINE HYDROCHLORIDE 1 ML: 10 INJECTION, SOLUTION EPIDURAL; INFILTRATION; INTRACAUDAL; PERINEURAL at 10:58

## 2017-11-29 RX ADMIN — PHENYLEPHRINE HYDROCHLORIDE 1 DROP: 2.5 SOLUTION/ DROPS OPHTHALMIC at 10:56

## 2017-11-29 RX ADMIN — DICLOFENAC SODIUM 1 DROP: 1 SOLUTION OPHTHALMIC at 10:44

## 2017-11-29 RX ADMIN — LIDOCAINE HYDROCHLORIDE 1 ML: 10 INJECTION, SOLUTION EPIDURAL; INFILTRATION; INTRACAUDAL; PERINEURAL at 11:59

## 2017-11-29 RX ADMIN — Medication 1 DROP: at 12:00

## 2017-11-29 NOTE — IP AVS SNAPSHOT
MRN:[id number]                      After Visit Summary   11/29/2017    Isaac Girard    MRN: [id number]           Thank you!     Thank you for choosing [facility] for your care. Our goal is always to provide you with excellent care. Hearing back from our patients is one way we can continue to improve our services. Please take a few minutes to complete the written survey that you may receive in the mail after you visit with us. Thank you!        Patient Information     Date Of Birth          1940        About your hospital stay     You were admitted on:  November 29, 2017 You last received care in the:  [facility]    You were discharged on:  November 29, 2017       Who to Call     For medical emergencies, please call 911.  For non-urgent questions about your medical care, please call your primary care provider or clinic, None  For questions related to your surgery, please call your surgery clinic        Attending Provider     Provider Ari Ruiz MD Ophthalmology       Primary Care Provider Fax #    [facility] [phone]      Further instructions from your care team       [facility] Discharge  Instructions  Anesthesia (Eye Care Center)   Adult Discharge Instructions    For 24 hours after surgery    1. Get plenty of rest.  Make arrangements to have a responsible adult stay with you for at least 6 hours after you leave the hospital.  2. Do not drive or use heavy equipment for 24 hours.    3. Do not drink alcohol for 24 hours.  4. Do not sign legal documents or make important decisions for 24 hours.  5. Avoid strenuous or risky activities. You may feel lightheaded.  If so, sit for a few minutes before standing.  Have someone help you get up.   6. Conscious sedation patients may resume a regular diet..  Any questions of medical nature, call your physician.[facility]  Cataract Surgery  "Discharge Instructions  [facility] MD ARSENIO Negrete MD J. Hasan, MD C. Nichols, MD J. O'Neill, MD S. Schaefer, MD J. Stephens, MD      Start using drops when you arrive at home today  Vigamox or Ofloxacin (Tan top) - one drop in surgical eye 3 times  per day until gone.   Prednisolone acetate 1.0% (pink or white top) - one drop in surgical eye 3 times per day until gone.  Ketorolac or Diclofenac (Grey top) - one drop in surgical eye 3 times per day until gone.   (Dr. Stovall's patients should use all drops 4 times daily)       OR   You may have been prescribed SmartDrops or OneDrop, which is a compound formula drop that combines all three medications in a single drop. This drop should be instilled to the surgical eye 3 times daily until gone.      Place shield over surgical eye at bedtime for 3 nights.    The eye will feel itchy, scratchy, and vision will be blurred, you may take Tylenol for the scratchy feeling if this is bothersome.    No eye rubbing or swimming for I week.    You may resume all prescription medications as directed by your primary doctor.    Call if increasing pain, progressively worsening vision or worsening redness of surgical eye.    7. On-call doctor can be reached at [phone].    Pending Results     No orders found from 11/27/2017 to 11/30/2017.            Admission Information     Date & Time Provider Department Dept. Phone    11/29/2017 Ari Licea MD [facility] [phone]      Your Vitals Were     Blood Pressure Temperature Respirations Height Weight Pulse Oximetry    110/74 95.9  F (35.5  C) (Temporal) 16 1.803 m (5' 11\") 97.3 kg (214 lb 9.6 oz) 97%    BMI (Body Mass Index)                   29.93 kg/m2           MyChart Information     [employer] lets you send messages to your doctor, view your test results, renew your prescriptions, schedule appointments and more. To sign " "up, go to [email or web address] . Click on \"Log in\" on the left side of the screen, which will take you to the Welcome page. Then click on \"Sign up Now\" on the right side of the page.     You will be asked to enter the access code listed below, as well as some personal information. Please follow the directions to create your username and password.     Your access code is: [email or web address]  Expires: 1/10/2018  7:25 AM     Your access code will  in 90 days. If you need help or a new code, please call your [facility] clinic or [phone].        Care EveryWhere ID     This is your Care EveryWhere ID. This could be used by other organizations to access your [facility] medical records  [id number]        Equal Access to Services     NATHAN SCOTT AH: Iman Salamanca, lyubov kearns, monique vang, richard jacobs. So [facility] [phone].    ATENCIÓN: Si habla español, tiene a haywood disposición servicios gratuitos de asistencia lingüística. Danny al [phone].    We comply with applicable federal civil rights laws and [place] laws. We do not discriminate on the basis of race, color, national origin, age, disability, sex, sexual orientation, or gender identity.               Review of your medicines      CONTINUE these medicines which have NOT CHANGED        Dose / Directions    alendronate 70 MG tablet   Commonly known as:  FOSAMAX        Dose:  70 mg   Take 1 tablet by mouth every 7 days.   Quantity:  12 tablet   Refills:  3       ASPIRIN PO        Dose:  81 mg   Take 81 mg by mouth daily   Refills:  0       calcium-vitamin D 250-125 MG-UNIT per tablet   Commonly known as:  OSCAL        Dose:  2 tablet   Take 2 tablets by mouth 3 times daily.   Quantity:  1 tablet   Refills:  0       chlorthalidone 25 MG tablet   Commonly known as:  HYGROTON        Dose:  25 mg   Take 1 tablet by mouth daily.   Quantity:  90 tablet   Refills:  1       fish oil-omega-3 fatty acids 1000 " MG capsule        Dose:  2 g   Take 2 capsules by mouth daily.   Quantity:  180 capsule   Refills:  12       flax seed oil 1000 MG capsule        Dose:  1 capsule   Take 1 capsule by mouth daily.   Quantity:  100 capsule   Refills:  3       JOINT HEALTH PO        Take by mouth daily   Refills:  0       KRILL OIL PO        Take by mouth daily   Refills:  0       multivitamin per tablet        Dose:  1 tablet   Take 1 tablet by mouth daily.   Quantity:  100 tablet   Refills:  12       nitroGLYcerin 0.4 MG sublingual tablet   Commonly known as:  NITROSTAT        Dose:  0.4 mg   Place 0.4 mg under the tongue every 5 minutes as needed for chest pain For chest pain place 1 tablet under the tongue every 5 minutes for 3 doses. If symptoms persist 5 minutes after 1st dose call 911.   Refills:  0       simvastatin 20 MG tablet   Commonly known as:  ZOCOR        Dose:  20 mg   Take 1 tablet by mouth At Bedtime. Take 1/2 tablet po at bedtime   Quantity:  90 tablet   Refills:  3       VITAMIN D (CHOLECALCIFEROL) PO        Take by mouth daily   Refills:  0                Protect others around you: Learn how to safely use, store and throw away your medicines at www.disposemymeds.org.             Medication List: This is a list of all your medications and when to take them. Check marks below indicate your daily home schedule. Keep this list as a reference.      Medications           Morning Afternoon Evening Bedtime As Needed    alendronate 70 MG tablet   Commonly known as:  FOSAMAX   Take 1 tablet by mouth every 7 days.                                ASPIRIN PO   Take 81 mg by mouth daily                                calcium-vitamin D 250-125 MG-UNIT per tablet   Commonly known as:  OSCAL   Take 2 tablets by mouth 3 times daily.                                chlorthalidone 25 MG tablet   Commonly known as:  HYGROTON   Take 1 tablet by mouth daily.                                fish oil-omega-3 fatty acids 1000 MG capsule    Take 2 capsules by mouth daily.                                flax seed oil 1000 MG capsule   Take 1 capsule by mouth daily.                                JOINT HEALTH PO   Take by mouth daily                                KRILL OIL PO   Take by mouth daily                                multivitamin per tablet   Take 1 tablet by mouth daily.                                nitroGLYcerin 0.4 MG sublingual tablet   Commonly known as:  NITROSTAT   Place 0.4 mg under the tongue every 5 minutes as needed for chest pain For chest pain place 1 tablet under the tongue every 5 minutes for 3 doses. If symptoms persist 5 minutes after 1st dose call 911.                                simvastatin 20 MG tablet   Commonly known as:  ZOCOR   Take 1 tablet by mouth At Bedtime. Take 1/2 tablet po at bedtime                                VITAMIN D (CHOLECALCIFEROL) PO   Take by mouth daily

## 2017-11-29 NOTE — ANESTHESIA PREPROCEDURE EVALUATION
Anesthesia Evaluation     . Pt has had prior anesthetic.     No history of anesthetic complications          ROS/MED HX    ENT/Pulmonary:      (-) sleep apnea   Neurologic:       Cardiovascular:     (+) Dyslipidemia, hypertension----. : . . . :. .       METS/Exercise Tolerance:     Hematologic:         Musculoskeletal:   (+) arthritis, , , -       GI/Hepatic:        (-) GERD and liver disease   Renal/Genitourinary:      (-) renal disease   Endo:      (-) Type II DM and thyroid disease   Psychiatric:         Infectious Disease:         Malignancy:         Other:                     Physical Exam  Normal systems: cardiovascular, pulmonary and dental    Airway   Mallampati: II  TM distance: >3 FB  Neck ROM: full    Dental     Cardiovascular       Pulmonary                         Anesthesia Plan      History & Physical Review  History and physical reviewed and following examination; no interval change.    ASA Status:  2 .    NPO Status:  > 8 hours    Plan for MAC Reason for MAC:  Procedure to face, neck, head or breast         Postoperative Care      Consents  Anesthetic plan, risks, benefits and alternatives discussed with:  Patient..          Procedure: Procedure(s):  PHACOEMULSIFICATION CLEAR CORNEA WITH TORIC INTRAOCULAR LENS IMPLANT  VITRECTOMY PARSPLANA WITH 25 GAUGE SYSTEM  Preop diagnosis: EPIRETINAL MEMBRANE ; CATARACT     No Known Allergies  Past Medical History:   Diagnosis Date     Arthritis     Osteoarthritis     History of angina      Hyperlipidemia      Hypertension      Osteoporosis      Skin cancer      Past Surgical History:   Procedure Laterality Date     ORTHOPEDIC SURGERY  2005    left ankle      ORTHOPEDIC SURGERY  1993    right hip      ORTHOPEDIC SURGERY  2011    ORIF right ankle     ORTHOPEDIC SURGERY      Left distal fibular fracture     PHACOEMULSIFICATION CLEAR CORNEA WITH TORIC INTRAOCULAR LENS IMPLANT Left 10/12/2017    Procedure: PHACOEMULSIFICATION CLEAR CORNEA WITH TORIC INTRAOCULAR  LENS IMPLANT;  LEFT EYE PHACOEMULSIFICATION CLEAR CORNEA WITH TORIC INTRAOCULAR LENS IMPLANT;  LEFT EYE VITRECTOMY PARSPLANA WITH 25 GAUGE SYSTEM, MEMBRANE PEEL, AIR FLUID EXCHANGE ;  Surgeon: Ari Licea MD;  Location: [facility]     VITRECTOMY PARSPLANA WITH 25 GAUGE SYSTEM Left 10/12/2017    Procedure: VITRECTOMY PARSPLANA WITH 25 GAUGE SYSTEM;;  Surgeon: Endy Xavier MD;  Location: [facility]     Prior to Admission medications    Medication Sig Start Date End Date Taking? Authorizing Provider   simvastatin (ZOCOR) 20 MG tablet Take 1 tablet by mouth At Bedtime. Take 1/2 tablet po at bedtime 5/13/11  Yes Jeff Dewitt MD   chlorthalidone (HYGROTEN) 25 MG tablet Take 1 tablet by mouth daily. 5/13/11  Yes Jeff Dewitt MD   fish oil-omega-3 fatty acids 1000 MG capsule Take 2 capsules by mouth daily. 5/13/11  Yes Jeff Dewitt MD   Flaxseed, Linseed, (FLAX SEED OIL) 1000 MG capsule Take 1 capsule by mouth daily. 5/13/11  Yes Jeff Dewitt MD   calcium-vitamin D (OSCAL) 250-125 MG-UNIT per tablet Take 2 tablets by mouth 3 times daily. 5/13/11  Yes Jeff Dewitt MD   ALENdronate (FOSAMAX) 70 MG tablet Take 1 tablet by mouth every 7 days. 5/13/11   Jeff Dewitt MD   Multiple Vitamin (MULTIVITAMIN) per tablet Take 1 tablet by mouth daily. 5/13/11   Jeff Dewitt MD     Current Facility-Administered Medications Ordered in Epic   Medication Dose Route Frequency Last Rate Last Dose     lidocaine (AKTEN) ophthalmic gel 0.5 mL  0.5 mL Ophthalmic Once         proparacaine (ALCAINE) 0.5 % ophthalmic solution 1 drop  1 drop Ophthalmic Once         povidone-iodine 5 % ophthalmic solution 1 drop  1 drop Ophthalmic Once         lidocaine 1 % 1 mL  1 mL Other Q1H PRN   1 mL at 11/29/17 1058     lactated ringers infusion   Intravenous Continuous 25 mL/hr at 11/29/17 1058       No current [facility]-ordered outpatient prescriptions on file.     Wt Readings from Last 1 Encounters:   11/28/17  97.3 kg (214 lb 9.6 oz)     Temp Readings from Last 1 Encounters:   11/29/17 35.5  C (95.9  F) (Temporal)     BP Readings from Last 6 Encounters:   11/29/17 137/83   10/12/17 (!) 120/92   05/13/11 146/80     Pulse Readings from Last 4 Encounters:   05/13/11 75     Resp Readings from Last 1 Encounters:   11/29/17 16     SpO2 Readings from Last 1 Encounters:   11/29/17 97%     Recent Labs   Lab Test  05/16/11   1635  05/13/11   1021   NA   --   140   POTASSIUM   --   3.8   CHLORIDE   --   98   CO2   --   28   ANIONGAP   --   14   GLC   --   91   BUN   --   19   CR  0.87  0.86   PAWEL   --   9.5     Recent Labs   Lab Test  05/13/11   1021   HGB  16.0     No results for input(s): INR in the last 17168 hours.    Invalid input(s): APTT   RECENT LABS:   ECG:   ECHO:   CXR:                  .

## 2017-11-29 NOTE — OP NOTE
PREOPERATIVE DIAGNOSIS: Cataract, Right eye.   POSTOPERATIVE DIAGNOSIS: Cataract, Right eye.   OPERATION: Phacoemulsification with implantation of posterior chamber intraocular lens, Right eye.   ANESTHESIA: Monitored anesthesia care.   INDICATIONS FOR SURGERY: Isaac Girard has noted a progressive decline in the vision of his Right eye secondary to a cataract. This has affected his ability to perform routine functions including reading. The patient has progressive cataract changes consistent with his vision and symptoms.     PROCEDURE: Informed consent was obtained from the patient preoperatively with the risks and alternatives reviewed, including the possibility of loss of vision. In the preoperative area, the patient was administered topical anesthetic consisting of 2% Xylocaine jelly. The patient was taken to the operating room. The face was prepped and draped in the usual sterile fashion. Attention was directed to the Right eye. A stab incision was made at the limbus with a 15-degree blade. Viscoat was used to replace aqueous. A keratome was used to make a limbal self-sealing incision 2.5 mm in diameter. A curvilinear capsulorrhexis was performed with the Utrata forceps. Hydrodissection was carried out. The nucleus was removed with the phacoemulsification handpiece in a four-quadrant cracking technique. The cortex was removed with the irrigation and aspiration handpiece. The posterior capsule remained intact. Provisc was used to inflate the capsular bag. A posterior chamber intraocular lens was taken from its case and inspected. It was free of defects and it was folded into the shooter. The lens was then injected into the eye by directing the leading haptic into the capsular bag. The trailing haptic was then placed in the eye with a haptic . The lens centered well. The Provisc was removed from the eye with the I&A handpiece. The eye was inflated with balanced salt solution. The wound was inspected and  found to be watertight. Topical Vigamox and Pred Forte were applied. An eye shield was placed over the eye. The patient tolerated the procedure well and left the operating area in good condition.       Implant Name Type Inv. Item Serial No.  Lot No. LRB No. Used   EYE IMP IOL MIA 1-PIECE TECNIS TORIC +19.5 [id number] Lens/Eye Implant EYE IMP IOL MIA 1-PIECE TECNIS TORIC +19.5 [id number] [facility] OPTIC   Right 1       Ari Licea M.D.

## 2017-11-29 NOTE — ANESTHESIA CARE TRANSFER NOTE
Patient: Isaac Girard    Procedure(s):  RIGHT EYE PHACOEMULSIFICATION CLEAR CORNEA WITH TORIC INTRAOCULAR LENS IMPLANT  - Wound Class: I-Clean    Diagnosis: CATARACT  Diagnosis Additional Information: No value filed.    Anesthesia Type:   MAC     Note:  Airway :Room Air  Patient transferred to:PACU  Comments: Awake, alert, VSS, report to RN, monitors and alarms on, IV patent.Handoff Report: Identifed the Patient, Identified the Reponsible Provider, Reviewed the pertinent medical history, Discussed the surgical course, Reviewed Intra-OP anesthesia mangement and issues during anesthesia, Set expectations for post-procedure period and Allowed opportunity for questions and acknowledgement of understanding      Vitals: (Last set prior to Anesthesia Care Transfer)    CRNA VITALS  11/29/2017 1147 - 11/29/2017 1220      11/29/2017             Resp Rate (set): 10                Electronically Signed By: PATTI Velásquez CRNA  November 29, 2017  12:20 PM

## 2017-11-29 NOTE — DISCHARGE INSTRUCTIONS
[facility] Discharge  Instructions  Anesthesia (Eye Care Center)   Adult Discharge Instructions    For 24 hours after surgery    1. Get plenty of rest.  Make arrangements to have a responsible adult stay with you for at least 6 hours after you leave the hospital.  2. Do not drive or use heavy equipment for 24 hours.    3. Do not drink alcohol for 24 hours.  4. Do not sign legal documents or make important decisions for 24 hours.  5. Avoid strenuous or risky activities. You may feel lightheaded.  If so, sit for a few minutes before standing.  Have someone help you get up.   6. Conscious sedation patients may resume a regular diet..  Any questions of medical nature, call your physician.[facility]  Cataract Surgery Discharge Instructions  [facility] MD ARSENIO Negrete MD J. Hasan, MD C. Nichols, MD J. O'Neill, MD S. Schaefer, MD J. Stephens, MD      Start using drops when you arrive at home today  Vigamox or Ofloxacin (Tan top) - one drop in surgical eye 3 times  per day until gone.   Prednisolone acetate 1.0% (pink or white top) - one drop in surgical eye 3 times per day until gone.  Ketorolac or Diclofenac (Grey top) - one drop in surgical eye 3 times per day until gone.   (Dr. Stovall's patients should use all drops 4 times daily)       OR   You may have been prescribed SmartDrops or OneDrop, which is a compound formula drop that combines all three medications in a single drop. This drop should be instilled to the surgical eye 3 times daily until gone.      Place shield over surgical eye at bedtime for 3 nights.    The eye will feel itchy, scratchy, and vision will be blurred, you may take Tylenol for the scratchy feeling if this is bothersome.    No eye rubbing or swimming for I week.    You may resume all prescription medications as directed by your primary doctor.    Call if  increasing pain, progressively worsening vision or worsening redness of surgical eye.    7. On-call doctor can be reached at [phone].

## 2017-11-29 NOTE — ANESTHESIA POSTPROCEDURE EVALUATION
Patient: Isaac Girard    Procedure(s):  RIGHT EYE PHACOEMULSIFICATION CLEAR CORNEA WITH TORIC INTRAOCULAR LENS IMPLANT  - Wound Class: I-Clean    Diagnosis:CATARACT  Diagnosis Additional Information: No value filed.    Anesthesia Type:  MAC    Note:  Anesthesia Post Evaluation    Patient location during evaluation: PACU  Patient participation: Able to fully participate in evaluation  Level of consciousness: awake and alert  Pain management: adequate  Airway patency: patent  Cardiovascular status: acceptable  Respiratory status: acceptable  Hydration status: acceptable  PONV: none     Anesthetic complications: None          Last vitals:  Vitals:    11/29/17 1053 11/29/17 1224 11/29/17 1241   BP: 137/83 110/74 119/76   Resp: 16 16 16   Temp: 35.5  C (95.9  F)     SpO2: 97% 97% 97%         Electronically Signed By: Endy Ramos MD  November 29, 2017  2:18 PM

## 2017-11-29 NOTE — IP AVS SNAPSHOT
[facility]    [address]    [place] [address]    Phone:  [phone]    Fax:  [phone]                                       After Visit Summary   11/29/2017    Isaac Girard    MRN: [id number]           After Visit Summary Signature Page     I have received my discharge instructions, and my questions have been answered. I have discussed any challenges I see with this plan with the nurse or doctor.    ..........................................................................................................................................  Patient/Patient Representative Signature      ..........................................................................................................................................  Patient Representative Print Name and Relationship to Patient    ..................................................               ................................................  Date                                            Time    ..........................................................................................................................................  Reviewed by Signature/Title    ...................................................              ..............................................  Date                                                            Time

## (undated) DEVICE — DRAPE MICROSCOPE DRAPE  EYE DI40001

## (undated) DEVICE — TAPE MICROPORE 2"X1.5YD 1530S-2

## (undated) DEVICE — EYE PACK CUSTOM ANTERIOR 30DEG TIP CENTURION PPK6682-04

## (undated) DEVICE — EYE TIP IRRIGATION & ASPIRATION POLYMER 35D BENT 8065751511

## (undated) DEVICE — SU ETHILON 10-0 CS160-6 12" 9000G

## (undated) DEVICE — PACK CATARACT CUSTOM SO DALE SEY32CTFCX

## (undated) DEVICE — NDL 18GA 1.5" 305196

## (undated) DEVICE — GLOVE PROTEXIS MICRO 7.0  2D73PM70

## (undated) DEVICE — GLOVE PROTEXIS MICRO 6.5  2D73PM65

## (undated) DEVICE — EYE NDL RETROBULBAR 25GA 1.5" 581275

## (undated) DEVICE — EYE PACK 25GA PLUS CONSTELLATION PPK4253

## (undated) DEVICE — SYR 05ML SLIP TIP W/O NDL 309647

## (undated) DEVICE — EYE SHIELD PLASTIC

## (undated) DEVICE — LINEN TOWEL PACK X5 5464

## (undated) DEVICE — GLOVE PROTEXIS MICRO 8.5  2D73PM85

## (undated) DEVICE — EYE MARKING PAD 581057

## (undated) DEVICE — EYE CANN SOFT TIP 25GA FOR VALVED SET 8065149530

## (undated) DEVICE — EYE SOL BSS 500ML

## (undated) DEVICE — EYE PACK BVI READYPAK KIT #2

## (undated) DEVICE — EYE KNIFE SLIT XSTAR VISITEC 2.5MM 45DEG DBL BEVEL 370825

## (undated) DEVICE — TAPE MICROPORE 1"X1.5YD 1530S-1

## (undated) DEVICE — EYE LENS VITRECTOMY MAGNIFYING ODVM

## (undated) DEVICE — GLOVE PROTEXIS MICRO 8.0  2D73PM80

## (undated) DEVICE — EYE FORCEP TIP ADV ILM DISP 25GA 725.44

## (undated) DEVICE — PACK VITRECTOMY PQ15VI57E

## (undated) DEVICE — SYR 05ML SLIP TIP W/O NDL

## (undated) RX ORDER — TIMOLOL 5 MG/ML
SOLUTION/ DROPS OPHTHALMIC
Status: DISPENSED
Start: 2017-10-12

## (undated) RX ORDER — FENTANYL CITRATE 50 UG/ML
INJECTION, SOLUTION INTRAMUSCULAR; INTRAVENOUS
Status: DISPENSED
Start: 2017-10-12

## (undated) RX ORDER — LIDOCAINE HYDROCHLORIDE 10 MG/ML
INJECTION, SOLUTION EPIDURAL; INFILTRATION; INTRACAUDAL; PERINEURAL
Status: DISPENSED
Start: 2017-11-29

## (undated) RX ORDER — BRIMONIDINE TARTRATE 2 MG/ML
SOLUTION/ DROPS OPHTHALMIC
Status: DISPENSED
Start: 2017-10-12

## (undated) RX ORDER — DEXAMETHASONE SODIUM PHOSPHATE 10 MG/ML
INJECTION, SOLUTION INTRAMUSCULAR; INTRAVENOUS
Status: DISPENSED
Start: 2017-10-12

## (undated) RX ORDER — CEFAZOLIN SODIUM 500 MG/2.2ML
INJECTION, POWDER, FOR SOLUTION INTRAMUSCULAR; INTRAVENOUS
Status: DISPENSED
Start: 2017-10-12

## (undated) RX ORDER — WATER 10 ML/10ML
INJECTION INTRAMUSCULAR; INTRAVENOUS; SUBCUTANEOUS
Status: DISPENSED
Start: 2017-10-12